# Patient Record
Sex: MALE | Race: WHITE | NOT HISPANIC OR LATINO | URBAN - METROPOLITAN AREA
[De-identification: names, ages, dates, MRNs, and addresses within clinical notes are randomized per-mention and may not be internally consistent; named-entity substitution may affect disease eponyms.]

---

## 2021-09-23 ENCOUNTER — HOSPITAL ENCOUNTER (EMERGENCY)
Facility: HOSPITAL | Age: 51
Discharge: HOME | End: 2021-09-23
Attending: EMERGENCY MEDICINE | Admitting: EMERGENCY MEDICINE

## 2021-09-23 VITALS
OXYGEN SATURATION: 99 % | RESPIRATION RATE: 18 BRPM | BODY MASS INDEX: 31.15 KG/M2 | HEIGHT: 72 IN | SYSTOLIC BLOOD PRESSURE: 130 MMHG | HEART RATE: 90 BPM | DIASTOLIC BLOOD PRESSURE: 94 MMHG | WEIGHT: 230 LBS | TEMPERATURE: 97.3 F

## 2021-09-23 DIAGNOSIS — Z86.73 HISTORY OF CVA (CEREBROVASCULAR ACCIDENT): Primary | ICD-10-CM

## 2021-09-23 PROCEDURE — 99282 EMERGENCY DEPT VISIT SF MDM: CPT

## 2021-09-23 RX ORDER — ATORVASTATIN CALCIUM 10 MG/1
40 TABLET, FILM COATED ORAL DAILY
Qty: 30 TABLET | Refills: 1 | Status: SHIPPED | OUTPATIENT
Start: 2021-09-23

## 2021-09-23 NOTE — DISCHARGE INSTRUCTIONS
Begin taking a daily aspirin 325 mg.  Call the office of  for a follow-up appointment.  Begin taking atorvastatin 40 mg once daily.  Prescription has been forwarded to your personal pharmacy.  The neurology team is anticipating seeing you in follow-up.  Thank you

## 2021-09-24 NOTE — ED PROVIDER NOTES
" EMERGENCY DEPARTMENT ENCOUNTER    Pt Name: Wilfrido Gonzalez  MRN: [id number]  Pt :   1970  Room Number:  26SF/26  Date of encounter:  2021  PCP: Ajit Dennis MD  ED Provider: PEGGY Pace    Historian: patient       HPI:  Chief Complaint:  Medication request        Context: Wilfrido Gonzalez is a 51 y.o. male who presents to the ED seeking outpatient management after history of a recent stroke.  Further inquiry reveals that approximately 1-1/2 weeks ago, patient states he was working around the house when he began to feel weird.  He attributed this to poor hydration and drank some pickle juice as a result.  That same afternoon, he went to [employer] and, shortly after walking into the store, he felt a loud noise in his ears with sudden disorientation.  He was recognized to be unwell and EMS was called to the scene.  EMS advised him to seek medical attention but he chose to defer.  For the next 4 days, he states that his right leg \"was not working quite right\" but he spontaneously returned to normal.  He took the rest of the week off work and returned on , 2 days ago.  Once again at work, he began to feel fatigued and unwell without specific or focal neurosensory complaint.  His boss recommended he be seen at a hospital.  He did go to a [facility] emergency department in [place].  CT without contrast of the head showed a subacute stroke with CT angiograms normal.  He was accepted for transfer by our hospitalist at that time.  In the meantime, Mr. Gonzalez left [facility] emergency department AGAINST MEDICAL ADVICE.  He understands that \"I am supposed to be on some blood thinners\".  He is seeking to initiate that plan of care.  He confirms that he continues to feel back to normal.    ROS is negative for fever chills or recent illness.  Negative for chest pain or cough or shortness of breath.  Negative for palpitations or heart racing.  GI and  systems are negative.  He has no " sensory complaints or motor or focal weakness.  No visual changes.    Patient states he uses marijuana on a regular basis.  He uses methamphetamine less regularly.  His last consumption of methamphetamine was over a week ago.  He consumes it by smoking.  He has never used IV administration.      PAST MEDICAL HISTORY  No past medical history on file.      PAST SURGICAL HISTORY  No past surgical history on file.      FAMILY HISTORY  No family history on file.      SOCIAL HISTORY  Social History     Socioeconomic History   • Marital status:      Spouse name: Not on file   • Number of children: Not on file   • Years of education: Not on file   • Highest education level: Not on file         ALLERGIES  Patient has no known allergies.        REVIEW OF SYSTEMS  Review of Systems     All systems reviewed and negative except for those discussed in HPI.       PHYSICAL EXAM    I have reviewed the triage vital signs and nursing notes.    ED Triage Vitals [09/23/21 1220]   Temp Heart Rate Resp BP SpO2   97.3 °F (36.3 °C) 90 18 130/94 99 %      Temp src Heart Rate Source Patient Position BP Location FiO2 (%)   Temporal Monitor Sitting Left arm --       Physical Exam  GENERAL:   Appears well.  HENT: Nares patent.  EYES: No scleral icterus.  CV: Regular rhythm, regular rate.  RESPIRATORY: Normal effort.  No audible wheezes, rales or rhonchi.  ABDOMEN: Soft, nontender  MUSCULOSKELETAL: No deformities.   NEURO: Alert, moves all extremities, follows commands.  SKIN: Warm, dry, no rash visualized.        LAB RESULTS  No results found for this or any previous visit (from the past 24 hour(s)).    If labs were ordered, I independently reviewed the results.        RADIOLOGY  No Radiology Exams Resulted Within Past 24 Hours        PROCEDURES    Procedures    No orders to display       MEDICATIONS GIVEN IN ER    Medications - No data to display          ED Course as of Sep 23 2130   Thu Sep 23, 2021   1333 I have spoken to Stroke  Navigators who also have conferred with Dr. Vences. This patient has normal neurological exam and he confirms he is feeling back to normal.  Stroke team setting up outpatient appointment; initiate statin and full dose aspirin.  DC substance use.     [MS]      ED Course User Index  [MS] Lois Wallace APRN             AS OF 21:30 EDT VITALS:    BP - 130/94  HR - 90  TEMP - 97.3 °F (36.3 °C) (Temporal)  O2 SATS - 99%        DIAGNOSIS  Final diagnoses:   History of CVA (cerebrovascular accident)         DISPOSITION    DISCHARGE    Patient discharged in stable condition.    Reviewed implications of results, diagnosis, meds, responsibility to follow up, warning signs and symptoms of possible worsening, potential complications and reasons to return to ER.    Patient/Family voiced understanding of above instructions.    Discussed plan for discharge, as there is no emergent indication for admission.  Pt/family is agreeable and understands need for follow up and possible repeat testing.  Pt/family is aware that discharge does not mean that nothing is wrong but that it indicates no emergency is currently present that requires admission and they must continue care with follow-up as given below or with a physician of their choice.     FOLLOW-UP  Hubert Sanchez MD  [address] [address] [phone]    Follow up in 6 week(s)  Suabacute stroke work up          Medication List      New Prescriptions    atorvastatin 10 MG tablet  Commonly known as: LIPITOR  Take 4 tablets by mouth Daily.           Where to Get Your Medications      You can get these medications from any pharmacy    Bring a paper prescription for each of these medications  · atorvastatin 10 MG tablet                  Lois Wallace APRN  09/23/21 1967